# Patient Record
Sex: FEMALE | Race: WHITE | NOT HISPANIC OR LATINO | URBAN - METROPOLITAN AREA
[De-identification: names, ages, dates, MRNs, and addresses within clinical notes are randomized per-mention and may not be internally consistent; named-entity substitution may affect disease eponyms.]

---

## 2018-11-27 ENCOUNTER — OFFICE VISIT (OUTPATIENT)
Dept: URBAN - METROPOLITAN AREA CLINIC 33 | Facility: CLINIC | Age: 73
End: 2018-11-27
Payer: COMMERCIAL

## 2018-11-27 PROCEDURE — 92133 CPTRZD OPH DX IMG PST SGM ON: CPT | Performed by: OPHTHALMOLOGY

## 2018-11-27 PROCEDURE — 99214 OFFICE O/P EST MOD 30 MIN: CPT | Performed by: OPHTHALMOLOGY

## 2018-11-27 ASSESSMENT — INTRAOCULAR PRESSURE
OD: 18
OS: 19

## 2018-11-27 NOTE — IMPRESSION/PLAN
Impression: Type 2 diabetes with stable prolif diabetic rtnop, bilateral: e11.3553. S/P PRP OU Plan: Discussed diagnosis in detail with patient. Discussed risks of progression. Emphasized blood sugar control. Will continue to observe condition and or symptoms. Call if [address] worsens.

## 2018-11-27 NOTE — IMPRESSION/PLAN
Impression: Primary open-angle glaucoma, left eye, moderate stage: V41.0637. IOP/ONH Stable OU Plan: OCT OU ordered and performed today and results discussed with patient. Discussed diagnosis in detail with patient. Discussed treatment options with patient. Current therapy is best for patient. Continue using current medication(s). Continue Travatan Z OS QHS. Will continue to observe condition and or symptoms. Patient instructed to call if condition gets worse.

## 2019-08-05 ENCOUNTER — OFFICE VISIT (OUTPATIENT)
Dept: URBAN - METROPOLITAN AREA CLINIC 33 | Facility: CLINIC | Age: 74
End: 2019-08-05
Payer: COMMERCIAL

## 2019-08-05 DIAGNOSIS — H57.813 BROW PTOSIS, BILATERAL: ICD-10-CM

## 2019-08-05 PROCEDURE — 99214 OFFICE O/P EST MOD 30 MIN: CPT | Performed by: OPHTHALMOLOGY

## 2019-08-05 PROCEDURE — 92133 CPTRZD OPH DX IMG PST SGM ON: CPT | Performed by: OPHTHALMOLOGY

## 2019-08-05 RX ORDER — TRAVOPROST 0.04 MG/ML
0.004 % SOLUTION/ DROPS OPHTHALMIC
Qty: 2.5 | Refills: 11 | Status: INACTIVE
Start: 2019-08-05 | End: 2020-02-11

## 2019-08-05 ASSESSMENT — INTRAOCULAR PRESSURE
OS: 18
OD: 20

## 2019-08-05 NOTE — IMPRESSION/PLAN
Impression: Primary open-angle glaucoma, left eye, moderate stage: T15.1200. IOP/ONH Stable OU, thick cct's may need to adjust IOP Plan: Discussed diagnosis in detail with patient. Discussed treatment options with patient. Discussed risks of progression. Current therapy is best for patient. Continue using current medication(s). Travatan Z OS QHS. Stressed compliance. Observe.

## 2019-08-05 NOTE — IMPRESSION/PLAN
Impression: Type 2 diabetes with stable prolif diabetic rtnop, bilateral: e11.3553. S/P PRP OU Plan: Discussed diagnosis in detail with patient. No treatment is required at this time. Discussed risks of progression. Emphasized blood sugar control. Will continue to observe condition and or symptoms. Call if [address] worsens.

## 2019-08-05 NOTE — IMPRESSION/PLAN
Impression: Brow ptosis, bilateral: H57.813. Condition: quality of life issue. Plan: Discussed diagnosis in detail with patient. Discussed treatment options with patient. Discussed risks of progression. Consult recommended [[facility]geon].

## 2019-08-20 ENCOUNTER — OFFICE VISIT (OUTPATIENT)
Dept: URBAN - METROPOLITAN AREA CLINIC 33 | Facility: CLINIC | Age: 74
End: 2019-08-20
Payer: COMMERCIAL

## 2019-08-20 DIAGNOSIS — H52.223 REGULAR ASTIGMATISM, BILATERAL: Primary | ICD-10-CM

## 2019-08-20 ASSESSMENT — KERATOMETRY
OS: 47.50
OD: 46.88

## 2019-08-20 ASSESSMENT — VISUAL ACUITY
OS: 20/40
OD: 20/40

## 2019-08-20 NOTE — IMPRESSION/PLAN
Impression: Regular astigmatism, bilateral: H52.223. OU. Plan: Educated patient about today's findings. Order refraction to determine patient's best corrected visual acuity as it relates to astigmatism- dispensed Rx to patient. Patient was educated about 1-2 week adaptation period with new Rx. Patient complaints about floaters OS, recommend a follow - up in 1 - 2 wks for a dilated exam to assess the retina.

## 2019-09-13 ENCOUNTER — OFFICE VISIT (OUTPATIENT)
Dept: URBAN - METROPOLITAN AREA CLINIC 33 | Facility: CLINIC | Age: 74
End: 2019-09-13
Payer: COMMERCIAL

## 2019-09-13 DIAGNOSIS — H02.423 MYOGENIC PTOSIS OF BILATERAL EYELIDS: Primary | ICD-10-CM

## 2019-09-13 PROCEDURE — 99214 OFFICE O/P EST MOD 30 MIN: CPT | Performed by: OPHTHALMOLOGY

## 2019-09-13 PROCEDURE — 92081 LIMITED VISUAL FIELD XM: CPT | Performed by: OPHTHALMOLOGY

## 2019-09-13 NOTE — IMPRESSION/PLAN
Impression: Myogenic ptosis of bilateral eyelids: H02.423. Plan: Discussed diagnosis in detail with patient. Discussed treatment options with patient. Not a good surgical candidate for Ptosis surgery. Will continue to monitor.

## 2020-01-01 ENCOUNTER — OFFICE VISIT (OUTPATIENT)
Dept: URBAN - METROPOLITAN AREA CLINIC 33 | Facility: CLINIC | Age: 75
End: 2020-01-01
Payer: COMMERCIAL

## 2020-01-01 PROCEDURE — 92133 CPTRZD OPH DX IMG PST SGM ON: CPT | Performed by: OPHTHALMOLOGY

## 2020-01-01 PROCEDURE — 99213 OFFICE O/P EST LOW 20 MIN: CPT | Performed by: OPHTHALMOLOGY

## 2020-01-01 ASSESSMENT — INTRAOCULAR PRESSURE
OD: 10
OS: 11

## 2020-02-11 ENCOUNTER — OFFICE VISIT (OUTPATIENT)
Dept: URBAN - METROPOLITAN AREA CLINIC 33 | Facility: CLINIC | Age: 75
End: 2020-02-11
Payer: COMMERCIAL

## 2020-02-11 DIAGNOSIS — H40.1132 PRIMARY OPEN-ANGLE GLAUCOMA, BILATERAL, MODERATE STAGE: Primary | ICD-10-CM

## 2020-02-11 DIAGNOSIS — E11.3553 TYPE 2 DIABETES WITH STABLE PROLIF DIABETIC RTNOP, BILATERAL: ICD-10-CM

## 2020-02-11 PROCEDURE — 99213 OFFICE O/P EST LOW 20 MIN: CPT | Performed by: OPHTHALMOLOGY

## 2020-02-11 RX ORDER — TRAVOPROST 0.04 MG/ML
0.004 % SOLUTION/ DROPS OPHTHALMIC
Qty: 7.5 | Refills: 3 | Status: ACTIVE
Start: 2020-02-11

## 2020-02-11 ASSESSMENT — INTRAOCULAR PRESSURE
OS: 15
OD: 17

## 2020-02-11 NOTE — IMPRESSION/PLAN
Impression: Type 2 diabetes with stable prolif diabetic rtnop, bilateral: e11.3553.  Plan: F/U with [facility] as scheduled

## 2020-02-11 NOTE — IMPRESSION/PLAN
Impression: Diagnosis: Primary open-angle glaucoma, bilateral, moderate stage. Code: E43.7889. 2/16 OCT 76/71 8/8, 2/16 HVF OU: SA/IA- dense, 5/17 Disc photos Plan: Discussed diagnosis in detail with patient. Discussed treatment options with patient. Current therapy is best for patient. Continue using current medication(s). Travatan Z OS QHS, Stressed compliance. Observe.

## 2020-08-11 NOTE — IMPRESSION/PLAN
Impression: Primary open-angle glaucoma, left eye, moderate stage: B23.1651. IOP/ONH Stable OU Plan: Discussed diagnosis in detail with patient. Discussed treatment options with patient. Current therapy is best for patient. Continue using current medication(s). Travatan Z OS QHS. Discussed risks of progression. Will continue to observe condition and or symptoms. Call if [address] worsens.

## 2021-01-01 ENCOUNTER — OFFICE VISIT (OUTPATIENT)
Dept: URBAN - METROPOLITAN AREA CLINIC 33 | Facility: CLINIC | Age: 76
End: 2021-01-01
Payer: COMMERCIAL

## 2021-01-01 DIAGNOSIS — H40.1122 PRIMARY OPEN-ANGLE GLAUCOMA, LEFT EYE, MODERATE STAGE: Primary | ICD-10-CM

## 2021-01-01 PROCEDURE — 99213 OFFICE O/P EST LOW 20 MIN: CPT | Performed by: OPHTHALMOLOGY

## 2021-01-01 ASSESSMENT — INTRAOCULAR PRESSURE
OS: 20
OD: 18

## 2021-02-23 NOTE — IMPRESSION/PLAN
Impression: Primary open-angle glaucoma, left eye, moderate stage: X22.9169. IOP/ONH Stable OU Plan: Discussed diagnosis in detail with patient. Discussed treatment options with patient. Current therapy is best for patient. Continue using current medication(s). Travatan Z OS QHS. Discussed risks of progression. Will continue to observe condition and or symptoms. Call if [address] worsens.